# Patient Record
Sex: MALE | URBAN - NONMETROPOLITAN AREA
[De-identification: names, ages, dates, MRNs, and addresses within clinical notes are randomized per-mention and may not be internally consistent; named-entity substitution may affect disease eponyms.]

---

## 2019-08-02 ENCOUNTER — NON-PROVIDER VISIT (OUTPATIENT)
Dept: URGENT CARE | Facility: PHYSICIAN GROUP | Age: 34
End: 2019-08-02
Payer: MEDICAID

## 2019-08-02 DIAGNOSIS — Z02.1 PRE-EMPLOYMENT DRUG SCREENING: ICD-10-CM

## 2019-08-02 PROCEDURE — 80305 DRUG TEST PRSMV DIR OPT OBS: CPT | Performed by: NURSE PRACTITIONER

## 2020-07-08 ENCOUNTER — OFFICE VISIT (OUTPATIENT)
Dept: URGENT CARE | Facility: PHYSICIAN GROUP | Age: 35
End: 2020-07-08
Payer: MEDICAID

## 2020-07-08 VITALS
DIASTOLIC BLOOD PRESSURE: 68 MMHG | WEIGHT: 184 LBS | SYSTOLIC BLOOD PRESSURE: 122 MMHG | TEMPERATURE: 98 F | HEIGHT: 70 IN | OXYGEN SATURATION: 98 % | BODY MASS INDEX: 26.34 KG/M2 | HEART RATE: 74 BPM

## 2020-07-08 DIAGNOSIS — R07.9 CHEST PAIN, UNSPECIFIED TYPE: ICD-10-CM

## 2020-07-08 PROCEDURE — 93000 ELECTROCARDIOGRAM COMPLETE: CPT | Performed by: PHYSICIAN ASSISTANT

## 2020-07-08 PROCEDURE — 99204 OFFICE O/P NEW MOD 45 MIN: CPT | Performed by: PHYSICIAN ASSISTANT

## 2020-07-08 ASSESSMENT — ENCOUNTER SYMPTOMS
CHILLS: 0
NAUSEA: 0
LEG PAIN: 0
EYE DISCHARGE: 0
LOWER EXTREMITY EDEMA: 0
SYNCOPE: 0
EXERTIONAL CHEST PRESSURE: 0
VOMITING: 0
ABDOMINAL PAIN: 0
ORTHOPNEA: 0
MUSCULOSKELETAL NEGATIVE: 1
DIZZINESS: 0
EYE REDNESS: 0
FEVER: 0
DIARRHEA: 0
NEAR-SYNCOPE: 0
PALPITATIONS: 0
SHORTNESS OF BREATH: 0

## 2020-07-08 NOTE — PROGRESS NOTES
Subjective:      Tarun Manley is a 35 y.o. male who presents with Chest Pain (having chest tighhtness and burning sensation / plemb)            Chest Pain    This is a new problem. The current episode started yesterday. The onset quality is sudden. The problem occurs constantly. The problem has been unchanged. The pain is present in the substernal region. The pain is at a severity of 3/10. The pain is mild. The quality of the pain is described as burning, tightness and pressure. The pain does not radiate. Pertinent negatives include no abdominal pain, dizziness, exertional chest pressure, fever, leg pain, lower extremity edema, nausea, near-syncope, orthopnea, palpitations, shortness of breath, syncope or vomiting. The pain is aggravated by nothing. He has tried nothing for the symptoms. Risk factors include male gender.     Patient presents to urgent care reporting anterior chest pain described as burning, tightness, and pressure starting last night. No history of the same. Pain does not radiate. He denies any aggravating or alleviating factors. No fevers, chills, body aches, palpitations, cough, congestion, wheezing, SOB, hemoptysis, abdominal pain, back pain, nausea, diaphoresis, or lower extremity edema. He has no known medical problems and doesn't take any regular medications. He denies personal or family history of cardiac disease. He is a former smoker, quit about 6 months ago with a 2 pack year history. He denies use of recreational drugs, specifically cocaine.       Review of Systems   Constitutional: Negative for chills and fever.   HENT: Negative for congestion.    Eyes: Negative for discharge and redness.   Respiratory: Negative for shortness of breath.    Cardiovascular: Positive for chest pain. Negative for palpitations, orthopnea, syncope and near-syncope.   Gastrointestinal: Negative for abdominal pain, diarrhea, nausea and vomiting.   Genitourinary: Negative.    Musculoskeletal: Negative.    Skin:  "Negative for rash.   Neurological: Negative for dizziness.   All other systems reviewed and are negative.       Objective:     /68   Pulse 74   Temp 36.7 °C (98 °F) (Temporal)   Ht 1.778 m (5' 10\")   Wt 83.5 kg (184 lb)   SpO2 98%   BMI 26.40 kg/m²      Physical Exam  Vitals signs and nursing note reviewed.   Constitutional:       Appearance: Normal appearance. He is well-developed.   HENT:      Head: Normocephalic and atraumatic.      Right Ear: External ear normal.      Left Ear: External ear normal.      Nose: Nose normal. No congestion or rhinorrhea.   Eyes:      Pupils: Pupils are equal, round, and reactive to light.   Neck:      Musculoskeletal: Normal range of motion.   Cardiovascular:      Rate and Rhythm: Normal rate and regular rhythm.      Heart sounds: Normal heart sounds. No murmur.      Comments: No sternal tenderness to palpation.   Pulmonary:      Effort: Pulmonary effort is normal.      Breath sounds: Normal breath sounds. No wheezing or rales.   Chest:      Chest wall: No tenderness.   Musculoskeletal: Normal range of motion.      Right lower leg: No edema.      Left lower leg: No edema.   Skin:     General: Skin is warm and dry.   Neurological:      Mental Status: He is alert and oriented to person, place, and time.   Psychiatric:         Behavior: Behavior normal.          PMH:  has no past medical history on file.  MEDS: No current outpatient medications on file.  ALLERGIES: No Known Allergies  SURGHX: History reviewed. No pertinent surgical history.  SOCHX:  reports that he quit smoking about 5 months ago. His smoking use included cigarettes. He has a 2.00 pack-year smoking history. He has never used smokeless tobacco. He reports current alcohol use of about 1.0 oz of alcohol per week. He reports current drug use. Drug: Methamphetamines.  FH: family history includes Cancer in his mother; Diabetes in his father; Stroke in his mother.       Assessment/Plan:       1. Chest pain, " unspecified type    - EKG - Clinic Performed --> left anterior fascicular block, no prior EKG for comparison.     Patient given GI cocktail in urgent care without significant relief of chest pain. Recommend he go directly to the ED for further evaluation and management. He is agreeable to this plan. He left in stable condition and will be driven to the hospital by his wife.

## 2020-09-01 ENCOUNTER — OFFICE VISIT (OUTPATIENT)
Dept: CARDIOLOGY | Facility: MEDICAL CENTER | Age: 35
End: 2020-09-01
Payer: MEDICAID

## 2020-09-01 VITALS
DIASTOLIC BLOOD PRESSURE: 80 MMHG | BODY MASS INDEX: 26.77 KG/M2 | HEART RATE: 60 BPM | HEIGHT: 70 IN | WEIGHT: 187 LBS | SYSTOLIC BLOOD PRESSURE: 128 MMHG | OXYGEN SATURATION: 96 %

## 2020-09-01 DIAGNOSIS — Z87.891 FORMER SMOKER: Chronic | ICD-10-CM

## 2020-09-01 DIAGNOSIS — I44.4 LEFT ANTERIOR FASCICULAR BLOCK (LAFB): Chronic | ICD-10-CM

## 2020-09-01 PROCEDURE — 99243 OFF/OP CNSLTJ NEW/EST LOW 30: CPT | Performed by: INTERNAL MEDICINE

## 2020-09-01 RX ORDER — CHOLECALCIFEROL (VITAMIN D3) 25 MCG
CAPSULE ORAL
COMMUNITY
Start: 2020-08-12

## 2020-09-01 RX ORDER — ASPIRIN 81 MG/1
TABLET, CHEWABLE ORAL
COMMUNITY
Start: 2020-07-08

## 2020-09-01 ASSESSMENT — ENCOUNTER SYMPTOMS
ABDOMINAL PAIN: 0
HEARTBURN: 1
DIZZINESS: 1
CHILLS: 0
BRUISES/BLEEDS EASILY: 0
ORTHOPNEA: 1
NERVOUS/ANXIOUS: 1
FALLS: 0
COUGH: 0
PALPITATIONS: 1
TINGLING: 1
BLURRED VISION: 0
FEVER: 0
NAUSEA: 0
CLAUDICATION: 0
WEAKNESS: 0
PND: 0
SHORTNESS OF BREATH: 1
SORE THROAT: 0
FOCAL WEAKNESS: 0

## 2020-09-01 NOTE — PROGRESS NOTES
Chief Complaint   Patient presents with   • New Patient     Chest Pain       Subjective:   Tarun Manley is a 35 y.o. 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 No pertinent surgical history.  Family History   Problem Relation Age of Onset   • Stroke Mother    • Cancer Mother         precancerous hysterectomy   • Diabetes Father      Social History     Socioeconomic History   • Marital status: Single     Spouse name: Not on file   • Number of children: Not on file   • Years of education: Not on file   • Highest education level: Not on file   Occupational History   • Not on file   Social Needs   • Financial resource strain: Not on file   • Food insecurity     Worry: Not on file     Inability: Not on file   • Transportation needs     Medical: Not on file     Non-medical: Not on file   Tobacco Use   • Smoking status: Former Smoker     Packs/day: 0.50     Years: 4.00     Pack years: 2.00     Types: Cigarettes     Quit date: 2020     Years since quittin.6   • Smokeless tobacco: Never Used   Substance and Sexual Activity   • Alcohol use: Yes     Alcohol/week: 1.0 oz     Types: 2 Cans of beer per week   • Drug use: Yes     Types: Methamphetamines     Comment: PREVIOUSLY   • Sexual activity: Yes     Partners: Female  "  Lifestyle   • Physical activity     Days per week: Not on file     Minutes per session: Not on file   • Stress: Not on file   Relationships   • Social connections     Talks on phone: Not on file     Gets together: Not on file     Attends [employer] witness service: Not on file     Active member of club or organization: Not on file     Attends meetings of clubs or organizations: Not on file     Relationship status: Not on file   • Intimate partner violence     Fear of current or ex partner: Not on file     Emotionally abused: Not on file     Physically abused: Not on file     Forced sexual activity: Not on file   Other Topics Concern   • Not on file   Social History Narrative   • Not on file     No Known Allergies  Outpatient Encounter Medications as of 9/1/2020   Medication Sig Dispense Refill   • ASPIRIN LOW STRENGTH 81 MG Chew Tab chewable tablet      • Cholecalciferol (VITAMIN D-3) 25 MCG (1000 UT) Cap TAKE 1 CAPSULE BY MOUTH ONCE DAILY FOR 90 DAYS       No facility-administered encounter medications on file as of 9/1/2020.      Review of Systems   Constitutional: Negative for chills and fever.   HENT: Negative for sore throat.    Eyes: Negative for blurred vision.   Respiratory: Positive for shortness of breath. Negative for cough.    Cardiovascular: Positive for chest pain, palpitations and orthopnea. Negative for claudication, leg swelling and PND.   Gastrointestinal: Positive for heartburn. Negative for abdominal pain and nausea.   Musculoskeletal: Negative for falls and joint pain.   Skin: Negative for rash.   Neurological: Positive for dizziness and tingling. Negative for focal weakness and weakness.   Endo/Heme/Allergies: Does not bruise/bleed easily.   Psychiatric/Behavioral: The patient is nervous/anxious.         Objective:   /80 (BP Location: Left arm, Patient Position: Sitting, BP Cuff Size: Adult)   Pulse 60   Ht 1.778 m (5' 10\")   Wt 84.8 kg (187 lb)   SpO2 96%   BMI 26.83 kg/m²     Physical " Exam   Constitutional: No distress.   HENT:   Patient wearing a mask due to COVID precautions   Eyes: No scleral icterus.   Neck: No JVD present.   Cardiovascular: Normal rate and normal heart sounds. Exam reveals no gallop and no friction rub.   No murmur heard.  Pulmonary/Chest: No respiratory distress. He has no wheezes. He has no rales.   Abdominal: Soft. Bowel sounds are normal.   Musculoskeletal:         General: No edema.   Neurological: He is alert.   Skin: No rash noted. He is not diaphoretic.   Psychiatric: He has a normal mood and affect.     Reviewed his EKG here at [facility] shows sinus rhythm with bradycardia and left anterior fascicular block    Labs from primary care note reviewed and  Assessment:     1. Left anterior fascicular block (LAFB)  EC-ECHOCARDIOGRAM COMPLETE W/O CONT    Treadmill Stress   2. Former smoker quit 2016 9 years of use         Medical Decision Making:  Today's Assessment / Status / Plan:     It was my pleasure to meet with Mr. Manley.    Blood pressure is well controlled.      For his chest pains and abnormal EKG we will start with an echocardiogram given his prior methamphetamine use if this is normal he could safely do a treadmill stress test to see if he has any ischemia    We will follow up with Mr. Manley on the results of the testing over the phone. We will determine further follow-up from there.    It is my pleasure to participate in the care of Mr. Manley.  Please do not hesitate to contact me with questions or concerns.    John Rosales MD PhD FACC  Cardiologist [facility]    Please note that this dictation was created using voice recognition software. There may be errors I did not discover before finalizing the note.     9/1/2020  11:49 AM

## 2020-09-01 NOTE — PATIENT INSTRUCTIONS
Work on at least 1.5 hours a week of moderate exercise (typical brisk walking or similar activity)    Please look into the following diets and incorporate them into your diet    LOW SALT DIET   KEEP YOUR SODIUM EQUAL TO CALORIES AND NO MORE THAN DOUBLE THE CALORIES FOR A LOW SALT DIET    Cardiosmart.org - great resource for American College of Cardiology on heart disease prevention and treatment      FOR TREATMENT OR PREVENTION OF CORONARY ARTERY DISEASE  These three programs are approved by Medicare/Insurers for those with heart disease  [facility] - [facility]  Dr. [facility] for Reversing Heart Disease - [place] [facility]ogt's Cardiologist vegetarian-based  [facility] - [place]-based mind-body Program    This is a commonly referenced Program  Dr Willis - [place] [facility] (book and documentary) - vegetarian-based      FOR TREATMENT OF BLOOD PRESSURE  DASH DIET - American Heart Association for treatment of HYPERTENSION    FOR TREATMENT OF BAD CHOLESTEROL/FATS  REDUCE PROCESSED SUGAR AS MUCH AS POSSIBLE  INCREASE WHOLE GRAINS/VEGETABLES    Lowering total cholesterol and LDL (bad) cholesterol:  - Eat leaner cuts of meat, or eliminate altogether if possible red meat, and frequently substitute fish or chicken.  - Limit saturated fat to no more than 7-10% of total calories no more than 10 g per day is recommended. Some sources of saturated fat include butter, animal fats, hydrogenated vegetable fats and oils, many desserts, whole milk dairy products.  - Replaced saturated fats with polyunsaturated fats and monounsaturated fats. Foods high in monounsaturated fat include nuts, although well, canola oil, avocados, and olives.  - Limit trans fat (processed foods) and replaced with fresh fruits and vegetables  - Recommend nonfat dairy products  - Increase substantially the amount of soluble fiber intake (legumes such as beans, fruit, whole grains).  - Consider  nutritional supplements: plant sterile spreads such as Benecol, fish oil,  flaxseed oil, omega-3 acids capsules 1000 mg twice a day, or viscous fiber such as Metamucil  - Attain ideal weight and regular exercise (at least 30 minutes per day of walking)    Lowering triglycerides:  - Reduce intake of simple sugar: Desserts, candy, pastries, honey, sodas, sugared cereals, yogurt, Gatorade, sports bars, canned fruit, smoothies, fruit juice, coffee drinks  - Reduced intake of refined starches: Refined Pasta  - Reduce or abstain from alcohol  - Increase omega-3 fatty acids: [place], Trout, Mackerel, Herring, Albacore tuna and supplements  - Attain ideal weight and regular exercise (at least 30 minutes per day of walking)      Elevating HDL (good) cholesterol:  - Increase physical activity  - Seasoned foods with garlic and onions  - Increase omega-3 fatty acids and supplements as listed above  - Incorporating appropriate amounts of monounsaturated fats such as nuts, olive oil, canola oil, avocados, olives  - Stop smoking  - Attain ideal weight and regular exercise (at least 30 minutes per day of walking)

## 2020-10-14 ENCOUNTER — TELEPHONE (OUTPATIENT)
Dept: CARDIOLOGY | Facility: MEDICAL CENTER | Age: 35
End: 2020-10-14

## 2020-10-14 DIAGNOSIS — I44.4 LEFT ANTERIOR FASCICULAR BLOCK (LAFB): Chronic | ICD-10-CM

## 2021-01-06 ENCOUNTER — OFFICE VISIT (OUTPATIENT)
Dept: URGENT CARE | Facility: PHYSICIAN GROUP | Age: 36
End: 2021-01-06
Payer: MEDICAID

## 2021-01-06 VITALS
OXYGEN SATURATION: 99 % | DIASTOLIC BLOOD PRESSURE: 70 MMHG | WEIGHT: 192 LBS | TEMPERATURE: 98 F | RESPIRATION RATE: 16 BRPM | BODY MASS INDEX: 27.49 KG/M2 | HEIGHT: 70 IN | SYSTOLIC BLOOD PRESSURE: 118 MMHG | HEART RATE: 72 BPM

## 2021-01-06 DIAGNOSIS — S60.459A SUBUNGUAL FOREIGN BODY OF FINGER, INITIAL ENCOUNTER: ICD-10-CM

## 2021-01-06 DIAGNOSIS — Z23 NEED FOR TETANUS BOOSTER: ICD-10-CM

## 2021-01-06 PROCEDURE — 90715 TDAP VACCINE 7 YRS/> IM: CPT | Performed by: PHYSICIAN ASSISTANT

## 2021-01-06 PROCEDURE — 11730 AVULSION NAIL PLATE SIMPLE 1: CPT | Performed by: PHYSICIAN ASSISTANT

## 2021-01-06 PROCEDURE — 90471 IMMUNIZATION ADMIN: CPT | Performed by: PHYSICIAN ASSISTANT

## 2021-01-06 RX ORDER — CEPHALEXIN 500 MG/1
500 CAPSULE ORAL 3 TIMES DAILY
Qty: 30 CAP | Refills: 0 | Status: SHIPPED | OUTPATIENT
Start: 2021-01-06 | End: 2021-01-16

## 2021-01-07 NOTE — PROGRESS NOTES
Chief Complaint   Patient presents with   • Foreign Body in Skin       HISTORY OF PRESENT ILLNESS: Patient is a 35 y.o. male who presents today because he was helping a friend with drywall and slid his finger down to 2 x 4 and got a wood splinter underneath his left long digit.  He was unable to pull it out and it has been giving him pain.  He has not been taking any medications for his current symptoms.  He has not had a tetanus shot in over 5 years.    Patient Active Problem List    Diagnosis Date Noted   • Left anterior fascicular block (LAFB)    • Former smoker quit 2016 9 years of use        Allergies:Patient has no known allergies.    Current Outpatient Medications Ordered in Epic   Medication Sig Dispense Refill   • cephALEXin (KEFLEX) 500 MG Cap Take 1 Cap by mouth 3 times a day for 10 days. 30 Cap 0   • ASPIRIN LOW STRENGTH 81 MG Chew Tab chewable tablet      • Cholecalciferol (VITAMIN D-3) 25 MCG (1000 UT) Cap TAKE 1 CAPSULE BY MOUTH ONCE DAILY FOR 90 DAYS       No current [facility]-ordered facility-administered medications on file.        Past Medical History:   Diagnosis Date   • Left anterior fascicular block (LAFB)        Social History     Tobacco Use   • Smoking status: Former Smoker     Packs/day: 0.50     Years: 4.00     Pack years: 2.00     Types: Cigarettes     Quit date: 2020     Years since quittin.9   • Smokeless tobacco: Never Used   Substance Use Topics   • Alcohol use: Yes     Alcohol/week: 1.0 oz     Types: 2 Cans of beer per week   • Drug use: Yes     Types: Methamphetamines     Comment: PREVIOUSLY       Family Status   Relation Name Status   • Mo  Alive   • Fa  Alive     Family History   Problem Relation Age of Onset   • Stroke Mother    • Cancer Mother         precancerous hysterectomy   • Diabetes Father        ROS:  Review of Systems   Constitutional: Negative for fever, chills, weight loss and malaise/fatigue.   HENT: Negative for ear pain, nosebleeds, congestion, sore throat and  "neck pain.    Eyes: Negative for blurred vision.   Respiratory: Negative for cough, sputum production, shortness of breath and wheezing.    Cardiovascular: Negative for chest pain, palpitations, orthopnea and leg swelling.   Gastrointestinal: Negative for heartburn, nausea, vomiting and abdominal pain.   Genitourinary: Negative for dysuria, urgency and frequency.     Exam:  /70 (BP Location: Right arm, Patient Position: Sitting, BP Cuff Size: Adult)   Pulse 72   Temp 36.7 °C (98 °F) (Temporal)   Resp 16   Ht 1.778 m (5' 10\")   Wt 87.1 kg (192 lb)   SpO2 99%   General:  Well nourished, well developed male in NAD  Head:Normocephalic, atraumatic  Eyes: PERRLA, EOM within normal limits, no conjunctival injection, no scleral icterus, visual fields and acuity grossly intact.  Nose: Symmetrical without tenderness, no discharge.  Mouth: reasonable hygiene, no erythema exudates or tonsillar enlargement.  Neck: no masses, range of motion within normal limits, no tracheal deviation. No obvious thyroid enlargement.  Extremities: no clubbing, cyanosis, or edema.  Visible foreign body underneath the left third digit nail.  Small amount of thick yellow fluid at the nail edge    Please note that this dictation was created using voice recognition software. I have made every reasonable attempt to correct obvious errors, but I expect that there are errors of grammar and possibly content that I did not discover before finalizing the note.    Assessment/Plan:  1. Subungual foreign body of finger, initial encounter  cephALEXin (KEFLEX) 500 MG Cap    Tdap =>6yo IM   2. Need for tetanus booster  Tdap =>6yo IM   Digit block performed using 4 cc 2% lidocaine without epinephrine.  I was able to remove approximately two thirds of the distal portion of the nail and remove a large wood splinter from the tissue below.  Wound was cleaned and dressed in the office, wound care instructions given.    Followup with primary care in the next " 7-10 days if not significantly improving, return to the urgent care or go to the emergency room sooner for any worsening of symptoms.